# Patient Record
Sex: FEMALE | Race: WHITE
[De-identification: names, ages, dates, MRNs, and addresses within clinical notes are randomized per-mention and may not be internally consistent; named-entity substitution may affect disease eponyms.]

---

## 2019-02-27 ENCOUNTER — HOSPITAL ENCOUNTER (OUTPATIENT)
Dept: HOSPITAL 89 - ZZSTITCHES | Age: 49
End: 2019-02-27
Attending: PHYSICIAN ASSISTANT
Payer: COMMERCIAL

## 2019-02-27 DIAGNOSIS — R19.7: Primary | ICD-10-CM

## 2019-02-27 DIAGNOSIS — R10.13: ICD-10-CM

## 2019-02-27 DIAGNOSIS — R10.32: ICD-10-CM

## 2019-02-27 LAB — PLATELET COUNT, AUTOMATED: 204 K/UL (ref 150–450)

## 2019-02-27 PROCEDURE — 84450 TRANSFERASE (AST) (SGOT): CPT

## 2019-02-27 PROCEDURE — 82435 ASSAY OF BLOOD CHLORIDE: CPT

## 2019-02-27 PROCEDURE — 82374 ASSAY BLOOD CARBON DIOXIDE: CPT

## 2019-02-27 PROCEDURE — 84295 ASSAY OF SERUM SODIUM: CPT

## 2019-02-27 PROCEDURE — 82247 BILIRUBIN TOTAL: CPT

## 2019-02-27 PROCEDURE — 84460 ALANINE AMINO (ALT) (SGPT): CPT

## 2019-02-27 PROCEDURE — 84155 ASSAY OF PROTEIN SERUM: CPT

## 2019-02-27 PROCEDURE — 84520 ASSAY OF UREA NITROGEN: CPT

## 2019-02-27 PROCEDURE — 36415 COLL VENOUS BLD VENIPUNCTURE: CPT

## 2019-02-27 PROCEDURE — 85025 COMPLETE CBC W/AUTO DIFF WBC: CPT

## 2019-02-27 PROCEDURE — 82565 ASSAY OF CREATININE: CPT

## 2019-02-27 PROCEDURE — 83690 ASSAY OF LIPASE: CPT

## 2019-02-27 PROCEDURE — 84075 ASSAY ALKALINE PHOSPHATASE: CPT

## 2019-02-27 PROCEDURE — 82310 ASSAY OF CALCIUM: CPT

## 2019-02-27 PROCEDURE — 82040 ASSAY OF SERUM ALBUMIN: CPT

## 2019-02-27 PROCEDURE — 84132 ASSAY OF SERUM POTASSIUM: CPT

## 2019-02-27 PROCEDURE — 82947 ASSAY GLUCOSE BLOOD QUANT: CPT

## 2019-02-28 ENCOUNTER — HOSPITAL ENCOUNTER (OUTPATIENT)
Dept: HOSPITAL 89 - US | Age: 49
End: 2019-02-28
Attending: PHYSICIAN ASSISTANT
Payer: COMMERCIAL

## 2019-02-28 DIAGNOSIS — D25.9: ICD-10-CM

## 2019-02-28 DIAGNOSIS — N83.291: ICD-10-CM

## 2019-02-28 DIAGNOSIS — R16.1: Primary | ICD-10-CM

## 2019-02-28 PROCEDURE — 76705 ECHO EXAM OF ABDOMEN: CPT

## 2019-02-28 PROCEDURE — 76830 TRANSVAGINAL US NON-OB: CPT

## 2019-02-28 NOTE — RADIOLOGY IMAGING REPORT
[place] [facility] [place] 

 

PATIENT NAME: Ian Cleaning

: 1970

MR: [id number]

V: [id number]

EXAM DATE: 

ORDERING PHYSICIAN: SHELTON MADSEN

TECHNOLOGIST: 

 

Location: [facility] - [place]

Patient: Ian Cleaning

: 1970

MRN: [id number]

Visit/Account:[id number]

Date of Sevice:  2019

 

ACCESSION #: [id number]

 

Exam type: SPLEEN ultrasound

 

History: Lump with left-sided pain

 

Comparison: CT abdomen and pelvis 2016.

 

Findings:

 

The spleen measures 12.7 cm in length and is mildly enlarged there is no demonstration of splenic mas
s or perisplenic fluid collection.  The adjacent kidney appears unremarkable with no evidence of hydr
onephrosis and measures 9.9 cm in length

 

IMPRESSION:

 

1.  Mild splenomegaly 12.7 cm in length although the spleen appears homogeneous with no evidence of p
erisplenic fluid

 

The adjacent left kidney appears unremarkable

 

Report Dictated By: Luz Comer MD at 2019 4:06 PM

 

Report E-Signed By: Luz Comer MD  at 2019 4:08 PM

 

WSN:[id number]

## 2019-02-28 NOTE — RADIOLOGY IMAGING REPORT
FACILITY: [facility] - [place] 

 

PATIENT NAME: Ian Cleaning

: 1970

MR: [id number]

V: [id number]

EXAM DATE: 

ORDERING PHYSICIAN: SHELTON MADSEN

TECHNOLOGIST: 

 

Location: [facility] - [place]

Patient: Ian Cleaning

: 1970

MRN: [id number]

Visit/Account:[id number]

Date of Sevice:  2019

 

ACCESSION #: [id number]

 

TRANSVAGINAL NON-OB

 

HISTORY:  Left-sided pelvic pain, history of cysts

 

TECHNIQUE:  Transvaginal ultrasound pelvis.

 

COMPARISON:  CT 2016 and pelvic ultrasound 2016

 

FINDINGS:

Uterus: Retroverted; 6.5 cm length x 6.7 cm AP x 4.1 cm transverse.

Myometrium: There is a 1.4 cm posterior uterine fibroid.

Endometrium: Unremarkable; double thickness 6.8 mm.

Cervix: Grossly negative.

 

Ovaries:

     Right - 3.6 x 1.6 x 4.2 cm.  There is a 1.9 cm simple cyst in the right ovary and two hypoechoic
 well-circumscribed masslike areas with some internal echoes one measuring 7 mm in diameter one measu
ring 1.2 cm in diameter.  These could represent hemorrhagic cyst although short-term interval follow-
up recommended

     Left - 3.2 x 2 x 2.8 cm and contains a 2.3 x 1.5 x 2.3 cm cyst

Blood flow is documented in each ovary by duplex Doppler ultrasound.

 

Adnexa: Grossly unremarkable.

Free pelvic fluid: None.

 

 

IMPRESSION:

1.4 cm posterior uterine fibroid

 

1.9 centers meter simple cyst in the right ovary and two hypoechoic masslike areas with some internal
 echoes measuring 7 mm and 1.2 cm the right ovary.  This could represent hemorrhagic cyst although sh
ort-term interval follow-up recommended 2.  0.3 cm left ovarian cyst

 

Report Dictated By: Luz Comer MD at 2019 4:00 PM

 

Report E-Signed By: Luz Comer MD  at 2019 4:06 PM

 

WSN:[id number]